# Patient Record
Sex: FEMALE | Race: WHITE | NOT HISPANIC OR LATINO | Employment: STUDENT | URBAN - METROPOLITAN AREA
[De-identification: names, ages, dates, MRNs, and addresses within clinical notes are randomized per-mention and may not be internally consistent; named-entity substitution may affect disease eponyms.]

---
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
## 2024-06-04 ENCOUNTER — TELEPHONE (OUTPATIENT)
Dept: NEUROLOGY | Facility: CLINIC | Age: 19
End: 2024-06-04
Payer: COMMERCIAL

## 2024-06-04 NOTE — TELEPHONE ENCOUNTER
----- Message from Alicia Marroquin sent at 6/4/2024  4:40 PM CDT -----  Contact: Shamika/mother  Pt mother is calling in regards to the pt having a blood clot behind brain.A Ct with and without contrast and was told not to eat Vitamin K.please call back at .[phone]           Thanks  NEWTON

## 2024-06-05 ENCOUNTER — TELEPHONE (OUTPATIENT)
Dept: NEUROLOGY | Facility: CLINIC | Age: 19
End: 2024-06-05
Payer: COMMERCIAL

## 2024-06-05 NOTE — TELEPHONE ENCOUNTER
----- Message from Blanka Potter sent at 6/5/2024 11:23 AM CDT -----  Contact: Shamika/mom  Type:  Sooner Apoointment Request    Caller is requesting a sooner appointment.  Caller declined first available appointment listed below.  Caller will not accept being placed on the waitlist and is requesting a message be sent to doctor.  Name of Caller:Shamika/mom   When is the first available appointment?6/12/24  Symptoms:blood clot in brain  Would the patient rather a call back or a response via [facility]? Call back   Best Call Back Number:[phone]  Additional Information: I went ahead and schedule patient for the first available and added her on wait list , Patient was recently Hospitalized in [facility] and discharged on 5/30/24. They did a CT scan w/wo contrast  to confirm the blood clot, she was given injections to giver herself every 12 hours (2xday). Patient will be out of the medication before the end of the week. Mom wasn't able to get the imaging but the number for the hospital is [phone]  Thanks   Am

## 2024-06-05 NOTE — TELEPHONE ENCOUNTER
----- Message from Francia Fu MD sent at 6/5/2024 11:54 AM CDT -----  Contact: Shamika/kieran  Vascular neurology.  ----- Message -----  From: Jaydon Bowman MA  Sent: 6/5/2024  11:42 AM CDT  To: Francia Fu MD    Neurosx ?  ----- Message -----  From: Blanka Potter  Sent: 6/5/2024  11:30 AM CDT  To: Nickie Feldman Staff    Type:  Sooner Apoointment Request    Caller is requesting a sooner appointment.  Caller declined first available appointment listed below.  Caller will not accept being placed on the waitlist and is requesting a message be sent to doctor.  Name of Caller:Shamika/kieran   When is the first available appointment?6/12/24  Symptoms:blood clot in brain  Would the patient rather a call back or a response via [facility]? Call back   Best Call Back Number:[phone]  Additional Information: I went ahead and schedule patient for the first available and added her on wait list , Patient was recently Hospitalized in [facility] and discharged on 5/30/24. They did a CT scan w/wo contrast  to confirm the blood clot, she was given injections to giver herself every 12 hours (2xday). Patient will be out of the medication before the end of the week. Mom wasn't able to get the imaging but the number for the hospital is [phone]  Thanks   Am

## 2024-06-06 ENCOUNTER — LAB VISIT (OUTPATIENT)
Dept: LAB | Facility: HOSPITAL | Age: 19
End: 2024-06-06
Attending: STUDENT IN AN ORGANIZED HEALTH CARE EDUCATION/TRAINING PROGRAM
Payer: COMMERCIAL

## 2024-06-06 ENCOUNTER — OFFICE VISIT (OUTPATIENT)
Dept: NEUROLOGY | Facility: CLINIC | Age: 19
End: 2024-06-06
Payer: COMMERCIAL

## 2024-06-06 ENCOUNTER — TELEPHONE (OUTPATIENT)
Dept: NEUROLOGY | Facility: HOSPITAL | Age: 19
End: 2024-06-06
Payer: COMMERCIAL

## 2024-06-06 VITALS
BODY MASS INDEX: 22.02 KG/M2 | HEIGHT: 64 IN | HEART RATE: 56 BPM | SYSTOLIC BLOOD PRESSURE: 123 MMHG | DIASTOLIC BLOOD PRESSURE: 78 MMHG | WEIGHT: 129 LBS

## 2024-06-06 DIAGNOSIS — G08 DURAL VENOUS SINUS THROMBOSIS: Primary | ICD-10-CM

## 2024-06-06 DIAGNOSIS — G08 DURAL VENOUS SINUS THROMBOSIS: ICD-10-CM

## 2024-06-06 LAB
BASOPHILS # BLD AUTO: 0.03 K/UL (ref 0–0.2)
BASOPHILS NFR BLD: 0.4 % (ref 0–1.9)
DIFFERENTIAL METHOD BLD: ABNORMAL
EOSINOPHIL # BLD AUTO: 0 K/UL (ref 0–0.5)
EOSINOPHIL NFR BLD: 0.6 % (ref 0–8)
ERYTHROCYTE [DISTWIDTH] IN BLOOD BY AUTOMATED COUNT: 12.5 % (ref 11.5–14.5)
HCT VFR BLD AUTO: 39.7 % (ref 37–48.5)
HGB BLD-MCNC: 13.3 G/DL (ref 12–16)
IMM GRANULOCYTES # BLD AUTO: 0.02 K/UL (ref 0–0.04)
IMM GRANULOCYTES NFR BLD AUTO: 0.3 % (ref 0–0.5)
INR PPP: 5 (ref 0.8–1.2)
LYMPHOCYTES # BLD AUTO: 4 K/UL (ref 1–4.8)
LYMPHOCYTES NFR BLD: 55.9 % (ref 18–48)
MCH RBC QN AUTO: 30.2 PG (ref 27–31)
MCHC RBC AUTO-ENTMCNC: 33.5 G/DL (ref 32–36)
MCV RBC AUTO: 90 FL (ref 82–98)
MONOCYTES # BLD AUTO: 0.4 K/UL (ref 0.3–1)
MONOCYTES NFR BLD: 5.8 % (ref 4–15)
NEUTROPHILS # BLD AUTO: 2.6 K/UL (ref 1.8–7.7)
NEUTROPHILS NFR BLD: 37 % (ref 38–73)
NRBC BLD-RTO: 0 /100 WBC
PLATELET # BLD AUTO: 293 K/UL (ref 150–450)
PMV BLD AUTO: 10.5 FL (ref 9.2–12.9)
PROTHROMBIN TIME: 49.4 SEC (ref 9–12.5)
RBC # BLD AUTO: 4.4 M/UL (ref 4–5.4)
WBC # BLD AUTO: 7.08 K/UL (ref 3.9–12.7)

## 2024-06-06 PROCEDURE — 3078F DIAST BP <80 MM HG: CPT | Mod: CPTII,S$GLB,, | Performed by: STUDENT IN AN ORGANIZED HEALTH CARE EDUCATION/TRAINING PROGRAM

## 2024-06-06 PROCEDURE — 3008F BODY MASS INDEX DOCD: CPT | Mod: CPTII,S$GLB,, | Performed by: STUDENT IN AN ORGANIZED HEALTH CARE EDUCATION/TRAINING PROGRAM

## 2024-06-06 PROCEDURE — 1159F MED LIST DOCD IN RCRD: CPT | Mod: CPTII,S$GLB,, | Performed by: STUDENT IN AN ORGANIZED HEALTH CARE EDUCATION/TRAINING PROGRAM

## 2024-06-06 PROCEDURE — 99205 OFFICE O/P NEW HI 60 MIN: CPT | Mod: S$GLB,,, | Performed by: STUDENT IN AN ORGANIZED HEALTH CARE EDUCATION/TRAINING PROGRAM

## 2024-06-06 PROCEDURE — 85025 COMPLETE CBC W/AUTO DIFF WBC: CPT | Performed by: STUDENT IN AN ORGANIZED HEALTH CARE EDUCATION/TRAINING PROGRAM

## 2024-06-06 PROCEDURE — 3074F SYST BP LT 130 MM HG: CPT | Mod: CPTII,S$GLB,, | Performed by: STUDENT IN AN ORGANIZED HEALTH CARE EDUCATION/TRAINING PROGRAM

## 2024-06-06 PROCEDURE — 85610 PROTHROMBIN TIME: CPT | Performed by: STUDENT IN AN ORGANIZED HEALTH CARE EDUCATION/TRAINING PROGRAM

## 2024-06-06 PROCEDURE — 99999 PR PBB SHADOW E&M-EST. PATIENT-LVL III: CPT | Mod: PBBFAC,,, | Performed by: STUDENT IN AN ORGANIZED HEALTH CARE EDUCATION/TRAINING PROGRAM

## 2024-06-06 PROCEDURE — 36415 COLL VENOUS BLD VENIPUNCTURE: CPT | Performed by: STUDENT IN AN ORGANIZED HEALTH CARE EDUCATION/TRAINING PROGRAM

## 2024-06-06 RX ORDER — ENOXAPARIN SODIUM 100 MG/ML
60 INJECTION SUBCUTANEOUS
COMMUNITY
Start: 2024-06-04

## 2024-06-06 RX ORDER — WARFARIN 7.5 MG/1
7.5 TABLET ORAL DAILY
COMMUNITY
Start: 2024-05-31

## 2024-06-06 NOTE — TELEPHONE ENCOUNTER
Called and spoke with the patient about her supratherapeutic INR greater than 5.  Instructed the patient to hold her warfarin doses until Monday.    She was instructed to have repeat INR at the local urgent Care or [facility].  They said they do not live near and [facility] lab.     Message sent to Dr. Dean.

## 2024-06-06 NOTE — PATIENT INSTRUCTIONS
- Plan to work with home PCP to transition off of warfarin and to start apixaban (eliquis) until repeat imaging is obtained.   - Plan for MRI brain with and without contrast (specifically with MR-RAGE sequencing) in 3 months to follow-up DVST An MRV head or CTV brain can also be obtained.   - Recommend follow-up with Hematology (benign)  - Avoid use of exogenous estrogens.

## 2024-06-06 NOTE — PROGRESS NOTES
Vascular Neurology Clinic  Initial Consult    Patient Name: Suhail Salmeron  MRN: [id number]  Date: 6/6/24  Referring Provider: Ever Self    CC: NANCYST    SUBJECTIVE:      History of Present Illness: Suhail Salmeron is a 19 y.o. female with no significant past medical history who presents for evaluation for a recent admission for dural venous sinus thrombosis.     She was admitted to [facility] in [place] around 05/27/2024 for headache. She was hit on the left side of her head with a softball a week prior after which she developed left, then right, then holocranial headache. Imaging showed right transverse and sigmoid dural venous sinus thrombosis per EHR (imaging and imaging reports are not available for review). No evidence of infarction or intracranial hemorrhage. She was started on a heparin gtt. Later transitioned to warfarin and therapeutic lovenox prior to discharge. Hematology was consulted; initial hypercoagulable work-up was unremarkable.     She has a history of estrogen-containing OCP use, now since discontinued. No known family history of blood clots.     Today, she remains on warfarin 7.5mg daily and therapeutic lovenox injections. She has not had follow-up or repeat labs. No new or worsening focal neurologic symptoms. Denies headache or vision changes currently. No bleeding complications.       Labs:  Lab Results   Component Value Date    INR 5.0 (HH) 06/06/2024     Lab Results   Component Value Date    WBC 7.08 06/06/2024    HGB 13.3 06/06/2024    HCT 39.7 06/06/2024    MCV 90 06/06/2024     06/06/2024       Review of Systems: The following systems were reviewed with pertinent positives and negatives documented in the HPI: constitutional, eyes, CV, respiratory, GI, , musculoskeletal, skin, neurological, psychiatric    Past Medical History:  No past medical history on file.    Medications:    Current Outpatient Medications:     doxycycline 50 MG capsule, , Disp: , Rfl:     ibuprofen  (ADVIL,MOTRIN) 600 MG tablet, Take 1 tablet (600 mg total) by mouth every 8 (eight) hours as needed for Pain. (Patient not taking: Reported on 2/1/2022), Disp: 30 tablet, Rfl: 0    ISOtretinoin (ACCUTANE) 40 MG capsule, Take 40 mg by mouth 2 (two) times daily., Disp: , Rfl:     norethindrone-e.estradioL-iron (FINZALA) 1 mg-20 mcg(24) /75 mg (4) Chew, Take 1 tablet by mouth once daily., Disp: 30 tablet, Rfl: 11    spironolactone (ALDACTONE) 50 MG tablet, TAKE ONE TABLET BY MOUTH once DAILY for 1 week. may increase to TWO TABLETS DAILY thereafter if tolerating well, Disp: , Rfl:   Any other notable medications as documented in HPI.    Allergies:  Review of patient's allergies indicates:  No Known Allergies    Social History:  Social History     Socioeconomic History    Marital status: Single   Tobacco Use    Smoking status: Never    Smokeless tobacco: Never   Substance and Sexual Activity    Alcohol use: Yes    Drug use: No    Sexual activity: Yes     Partners: Male     Birth control/protection: OCP     Any other notable Social History as documented in HPI.    Family History:  Family History   Problem Relation Name Age of Onset    Esophageal cancer Maternal Grandfather      Prostate cancer Maternal Grandfather          GGF    Lung cancer Paternal Grandmother       Any other notable FMH as documented in HPI.      OBJECTIVE:     Physical Exam:   Vitals:    06/06/24 1445   BP: 123/78   Pulse: (!) 56     GEN: NAD, pleasant, cooperative  CV: RRR  PULM: No signs of resp distress, on room air  EXT: No cyanosis, clubbing, or edema.    NEURO:  Mental status: The patient is alert, attentive, and oriented to self, age, month.  Speech: Fluent speech with intact repetition, comprehension, and naming. Phonation is normal.    Cranial nerves:  CN II: Visual fields are full to confrontation. Pupils are 3mm and reactive to light OU.  CN III, IV, VI: EOMI, no nystagmus, no ptosis  CN V: Facial sensation is intact in all 3 divisions  bilaterally.  CN VII: Face is symmetric with normal eye closure and smile.  CN VIII: Hearing is normal bilaterally.  CN IX, X: Palate elevates symmetrically.   CN XI: Head turning and shoulder shrug are intact.  CN XII: Tongue is midline with normal movements and no atrophy.    Motor: Muscle bulk and tone are normal. No pronator drift. 5/5 strength throughout.     Reflexes: Reflexes are 2+ and symmetric at the biceps, triceps, knees, and ankles.     Sensory: Light touch, pinprick, position sense, temperature, and vibration sense are intact in bilateral upper and lower extremities. Absent romberg.    Coordination: Rapid alternating movements and fine finger movements are intact. There is no dysmetria on finger-to-nose and heel-knee-shin. There are no abnormal or extraneous movements.    Gait/Stance: Posture is normal. Gait is steady with normal steps, base, arm swing, and turning.       ASSESSMENT/PLAN:     Diagnosis/Etiology: Right transverse dural venous sinus thrombosis; concern for provoked DVST in the setting of exogenous estrogen use. Plan for anticoagulation and follow-up as detailed below.   Stroke Risk Factors: Estrogen-containing oral contraceptive pills    Recommendations:   - Patient lives in [place] and will be away in [place] for school in the fall. I recommend close follow-up with her PCPs in [place] as well as searching for neurology providers in the [place] area to schedule a follow-up appointment in 3 months.   - Recommend MRI brain with and without contrast (with MP-RAGE sequence if available) in 3 months. If this imaging is not available in her area, recommend MRV or CTV head to assess for residual thrombus.   - Antiplatelet/Anticoagulation: Plan to transition off of warfarin/lovenox to apixaban 5mg BID (ordered) with the assistance of her providers in [place]. INR obtained today showed supratherapeutic levels. I called patient with results and advised her to hold all  anticoagulation at this time and to obtain a repeat INR with her provider in [place] on Monday 06/10/2024. Recommend transition to apixaban 5mg BID for at least 3-6 months when INR is subtherapeutic.   - Recommend follow-up with benign hematology for additional hypercoagulable work-up and to assist with timing of anticoagulation.   - Return to clinic as needed if she is in the area and requires additional assistance.     Patient was instructed in the signs and symptoms of stroke, including: unilateral strength or sensation change, facial droop, language difficulty, diplopia, visual field loss, and acute onset severe vertigo. Patient was instructed to immediately call EMS in the setting of any of these signs/symptoms to be evaluated for potential therapy. Patient verbalized understanding.     Problem List Items Addressed This Visit    None  Visit Diagnoses       Dural venous sinus thrombosis    -  Primary    Relevant Medications    apixaban (ELIQUIS) 5 mg Tab    Other Relevant Orders    CBC auto differential (Completed)    PROTIME-INR (Completed)          65 minutes of total time spent on the encounter, which includes face to face time and non-face to face time preparing to see the patient (eg, review of tests), obtaining and/or reviewing separately obtained history, documenting clinical information in the electronic or other health record, independently interpreting results (not separately reported), and communicating results to the patient/family/caregiver, patient/family education, and care coordination (not separately reported).     Cheyenne Dean MD, PhD  [facility]  Department of Vascular Neurology

## 2024-06-09 ENCOUNTER — HOSPITAL ENCOUNTER (EMERGENCY)
Facility: HOSPITAL | Age: 19
Discharge: HOME | End: 2024-06-09
Attending: EMERGENCY MEDICINE
Payer: COMMERCIAL

## 2024-06-09 VITALS
SYSTOLIC BLOOD PRESSURE: 139 MMHG | OXYGEN SATURATION: 99 % | TEMPERATURE: 99 F | DIASTOLIC BLOOD PRESSURE: 88 MMHG | BODY MASS INDEX: 22.06 KG/M2 | RESPIRATION RATE: 18 BRPM | WEIGHT: 128.5 LBS | HEART RATE: 70 BPM

## 2024-06-09 DIAGNOSIS — Z01.89 ENCOUNTER FOR LABORATORY TEST: Primary | ICD-10-CM

## 2024-06-09 LAB
INR PPP: 1.1 (ref 0.8–1.2)
PROTHROMBIN TIME: 12.6 SEC (ref 9–12.5)

## 2024-06-09 PROCEDURE — 99283 EMERGENCY DEPT VISIT LOW MDM: CPT | Mod: ER

## 2024-06-09 PROCEDURE — 85610 PROTHROMBIN TIME: CPT | Mod: ER | Performed by: EMERGENCY MEDICINE

## 2024-06-09 NOTE — ED PROVIDER NOTES
Encounter Date: 6/9/2024       History     Chief Complaint   Patient presents with    Headache     Intermittent headache that began last night. Was taken off of blood thinners on Friday. Requesting INR to see if she can start taking elequis. Last labs done on Friday, INR was 5. Head not currently hurting.      The history is provided by the patient.   Pt c recent dx of dural venous sinus thrombosis presents for INR check following an elevated reading on 6/6 of 5.0 Pt denies HA currently.     Review of patient's allergies indicates:  No Known Allergies  No past medical history on file.  Past Surgical History:   Procedure Laterality Date    SHOULDER SURGERY       Family History   Problem Relation Name Age of Onset    Esophageal cancer Maternal Grandfather      Prostate cancer Maternal Grandfather          GGF    Lung cancer Paternal Grandmother       Social History     Tobacco Use    Smoking status: Never    Smokeless tobacco: Never   Substance Use Topics    Alcohol use: Yes    Drug use: No     Review of Systems   Constitutional:  Negative for fever.   HENT:  Negative for sore throat.    Respiratory:  Negative for shortness of breath.    Cardiovascular:  Negative for chest pain.   Gastrointestinal:  Negative for nausea.   Genitourinary:  Negative for dysuria.   Musculoskeletal:  Negative for back pain.   Skin:  Negative for rash.   Neurological:  Negative for weakness.   Hematological:  Does not bruise/bleed easily.       Physical Exam     Initial Vitals [06/09/24 1227]   BP Pulse Resp Temp SpO2   139/88 70 18 98.5 °F (36.9 °C) 99 %      MAP       --         Physical Exam    Nursing note and vitals reviewed.  Constitutional: She appears well-developed and well-nourished. No distress.   HENT:   Head: Normocephalic and atraumatic.   Mouth/Throat: Oropharynx is clear and moist.   Eyes: Conjunctivae and EOM are normal. Pupils are equal, round, and reactive to light.   Neck: Neck supple.   Normal range of  motion.  Cardiovascular:  Normal rate, regular rhythm and normal heart sounds.           Pulmonary/Chest: Breath sounds normal. No respiratory distress.   Abdominal: Abdomen is soft. Bowel sounds are normal. She exhibits no distension. There is no abdominal tenderness.   Musculoskeletal:         General: Normal range of motion.      Cervical back: Normal range of motion and neck supple.     Neurological: She is alert and oriented to person, place, and time. She has normal strength.   Skin: Skin is warm and dry.   Psychiatric: She has a normal mood and affect. Thought content normal.         ED Course   Procedures  Labs Reviewed   PROTIME-INR - Abnormal; Notable for the following components:       Result Value    Prothrombin Time 12.6 (*)     All other components within normal limits     Results for orders placed or performed during the hospital encounter of 06/09/24   Protime-INR   Result Value Ref Range    Prothrombin Time 12.6 (H) 9.0 - 12.5 sec    INR 1.1 0.8 - 1.2            Imaging Results    None          Medications - No data to display  Medical Decision Making  DDx Coumadin lab check, Coumadin toxicity, subtherapeutic level    Problems Addressed:  Encounter for laboratory test: acute illness or injury    Amount and/or Complexity of Data Reviewed  Labs: ordered.     Details: INR 1.1    Begin Anticoagulation as directed by PCP                                  Clinical Impression:  Final diagnoses:  [Z01.89] Encounter for laboratory test (Primary)          ED Disposition Condition    Discharge Stable          ED Prescriptions    None       Follow-up Information       Follow up With Specialties Details Why Contact Info    Robert Devine MD Pediatrics Schedule an appointment as soon as possible for a visit in 2 days  [address]  [facility] [address]  [phone]      [facility] - Emergency Dept Emergency Medicine  If symptoms worsen [address]  [facility] [id number] [phone]              Miah Salmeron MD  06/09/24 7848

## 2024-07-22 ENCOUNTER — PATIENT MESSAGE (OUTPATIENT)
Dept: NEUROLOGY | Facility: CLINIC | Age: 19
End: 2024-07-22
Payer: COMMERCIAL

## 2024-07-25 DIAGNOSIS — G08 DURAL VENOUS SINUS THROMBOSIS: Primary | ICD-10-CM

## 2024-07-31 ENCOUNTER — HOSPITAL ENCOUNTER (OUTPATIENT)
Dept: RADIOLOGY | Facility: HOSPITAL | Age: 19
Discharge: HOME | End: 2024-07-31
Attending: STUDENT IN AN ORGANIZED HEALTH CARE EDUCATION/TRAINING PROGRAM
Payer: COMMERCIAL

## 2024-07-31 DIAGNOSIS — G08 DURAL VENOUS SINUS THROMBOSIS: ICD-10-CM

## 2024-07-31 PROCEDURE — 70553 MRI BRAIN STEM W/O & W/DYE: CPT | Mod: 26,,, | Performed by: RADIOLOGY

## 2024-07-31 PROCEDURE — A9585 GADOBUTROL INJECTION: HCPCS | Mod: PO | Performed by: STUDENT IN AN ORGANIZED HEALTH CARE EDUCATION/TRAINING PROGRAM

## 2024-07-31 PROCEDURE — 70553 MRI BRAIN STEM W/O & W/DYE: CPT | Mod: TC,PO

## 2024-07-31 PROCEDURE — 25500020 PHARM REV CODE 255: Mod: PO | Performed by: STUDENT IN AN ORGANIZED HEALTH CARE EDUCATION/TRAINING PROGRAM

## 2024-07-31 RX ORDER — GADOBUTROL 604.72 MG/ML
6 INJECTION INTRAVENOUS
Status: COMPLETED | OUTPATIENT
Start: 2024-07-31 | End: 2024-07-31

## 2024-07-31 RX ADMIN — GADOBUTROL 6 ML: 604.72 INJECTION INTRAVENOUS at 09:07

## 2024-08-05 DIAGNOSIS — G08 DURAL VENOUS SINUS THROMBOSIS: ICD-10-CM

## 2024-09-04 ENCOUNTER — PATIENT MESSAGE (OUTPATIENT)
Dept: NEUROLOGY | Facility: CLINIC | Age: 19
End: 2024-09-04
Payer: COMMERCIAL

## 2024-09-05 NOTE — TELEPHONE ENCOUNTER
Spoke with mom. Scheduled virtual follow up with Dr. Dean 9/11. Patient needs to follow up with neurologist before she can be released to play for [employer].

## 2025-08-05 ENCOUNTER — LAB VISIT (OUTPATIENT)
Dept: LAB | Facility: HOSPITAL | Age: 20
End: 2025-08-05
Payer: COMMERCIAL

## 2025-08-05 DIAGNOSIS — N91.0 AMENORRHEA, PRIMARY: Primary | ICD-10-CM

## 2025-08-05 LAB
25(OH)D3+25(OH)D2 SERPL-MCNC: 50 NG/ML (ref 30–80)
FSH SERPL-ACNC: 3.19 MIU/ML
LH SERPL-ACNC: 2.01 MIU/ML
PROLACTIN LEVEL (OLG): 5.1 NG/ML (ref 5.18–26.53)
TESTOST SERPL-MCNC: 26.77 NG/DL (ref 13.84–53.35)
TSH SERPL-ACNC: 0.58 UIU/ML (ref 0.35–4.94)

## 2025-08-05 PROCEDURE — 83002 ASSAY OF GONADOTROPIN (LH): CPT

## 2025-08-05 PROCEDURE — 84403 ASSAY OF TOTAL TESTOSTERONE: CPT

## 2025-08-05 PROCEDURE — 82306 VITAMIN D 25 HYDROXY: CPT

## 2025-08-05 PROCEDURE — 82627 DEHYDROEPIANDROSTERONE: CPT

## 2025-08-05 PROCEDURE — 83001 ASSAY OF GONADOTROPIN (FSH): CPT

## 2025-08-05 PROCEDURE — 84443 ASSAY THYROID STIM HORMONE: CPT

## 2025-08-05 PROCEDURE — 84146 ASSAY OF PROLACTIN: CPT

## 2025-08-05 PROCEDURE — 36415 COLL VENOUS BLD VENIPUNCTURE: CPT

## 2025-08-07 LAB — DHEA-S SERPL-MCNC: 125 MCG/DL (ref 83–377)

## 2025-08-12 ENCOUNTER — LAB VISIT (OUTPATIENT)
Dept: LAB | Facility: HOSPITAL | Age: 20
End: 2025-08-12
Payer: COMMERCIAL

## 2025-08-12 DIAGNOSIS — O92.6 PERSISTENT POSTPARTUM AMENORRHEA-GALACTORRHEA SYNDROME: Primary | ICD-10-CM

## 2025-08-12 DIAGNOSIS — N91.1 PERSISTENT POSTPARTUM AMENORRHEA-GALACTORRHEA SYNDROME: Primary | ICD-10-CM

## 2025-08-12 LAB
GLUCOSE 1.5H P 100 G GLC PO SERPL-MCNC: 63 MG/DL (ref 74–100)
GLUCOSE 1H P 100 G GLC PO SERPL-MCNC: 77 MG/DL (ref 100–180)
GLUCOSE 2H P 100 G GLC PO SERPL-MCNC: 49 MG/DL (ref 70–140)
GLUCOSE 30M P 100 G GLC PO SERPL-MCNC: 112 MG/DL (ref 74–100)
GLUCOSE P FAST SERPL-MCNC: 83 MG/DL (ref 70–100)
INSULIN 120 (OHS): 3.7 UU/ML (ref 16–166)
INSULIN 30 (OHS): 35.1 UU/ML (ref 30–230)
INSULIN 60 (OHS): 26.6 UU/ML (ref 18–276)
INSULIN 90 (OHS): 26.1 UU/ML
INSULIN FASTING (OHS): 5.6 UU/ML
POCT GLUCOSE: 85 MG/DL (ref 70–110)

## 2025-08-12 PROCEDURE — 82951 GLUCOSE TOLERANCE TEST (GTT): CPT

## 2025-08-12 PROCEDURE — 83525 ASSAY OF INSULIN: CPT

## 2025-08-12 PROCEDURE — 36415 COLL VENOUS BLD VENIPUNCTURE: CPT

## 2025-08-12 PROCEDURE — 83525 ASSAY OF INSULIN: CPT | Mod: 59

## 2025-08-12 PROCEDURE — 82947 ASSAY GLUCOSE BLOOD QUANT: CPT

## 2025-08-12 PROCEDURE — 82950 GLUCOSE TEST: CPT
